# Patient Record
Sex: FEMALE | Race: AMERICAN INDIAN OR ALASKA NATIVE
[De-identification: names, ages, dates, MRNs, and addresses within clinical notes are randomized per-mention and may not be internally consistent; named-entity substitution may affect disease eponyms.]

---

## 2020-02-06 ENCOUNTER — HOSPITAL ENCOUNTER (EMERGENCY)
Dept: HOSPITAL 5 - ED | Age: 40
Discharge: HOME | End: 2020-02-06
Payer: SELF-PAY

## 2020-02-06 VITALS — SYSTOLIC BLOOD PRESSURE: 110 MMHG | DIASTOLIC BLOOD PRESSURE: 62 MMHG

## 2020-02-06 DIAGNOSIS — Z79.899: ICD-10-CM

## 2020-02-06 DIAGNOSIS — E03.9: ICD-10-CM

## 2020-02-06 DIAGNOSIS — Z79.1: ICD-10-CM

## 2020-02-06 DIAGNOSIS — J01.10: Primary | ICD-10-CM

## 2020-02-06 DIAGNOSIS — J20.9: ICD-10-CM

## 2020-02-06 DIAGNOSIS — Z98.890: ICD-10-CM

## 2020-02-06 LAB
BILIRUB UR QL STRIP: (no result)
BLOOD UR QL VISUAL: (no result)
MUCOUS THREADS #/AREA URNS HPF: (no result) /HPF
PH UR STRIP: 5 [PH] (ref 5–7)
PROT UR STRIP-MCNC: (no result) MG/DL
RBC #/AREA URNS HPF: 3 /HPF (ref 0–6)
UROBILINOGEN UR-MCNC: 2 MG/DL (ref ?–2)
WBC #/AREA URNS HPF: 3 /HPF (ref 0–6)

## 2020-02-06 PROCEDURE — 71046 X-RAY EXAM CHEST 2 VIEWS: CPT

## 2020-02-06 PROCEDURE — 81001 URINALYSIS AUTO W/SCOPE: CPT

## 2020-02-06 PROCEDURE — 99284 EMERGENCY DEPT VISIT MOD MDM: CPT

## 2020-02-06 PROCEDURE — 81025 URINE PREGNANCY TEST: CPT

## 2020-02-06 NOTE — XRAY REPORT
CHEST 2 VIEWS, 2/6/2020 9:22 PM



INDICATION: Cough



COMPARISON: None



FINDINGS:



Support devices: None

Heart: The cardiac silhouette is normal in size.

Lungs/pleura: The lungs are well expanded and appear clear.

Additional findings: No significant acute abnormality.



IMPRESSION:

1. No evidence of acute cardiopulmonary process.



Signer Name: Kathryn Ibarra MD 

Signed: 2/6/2020 9:41 PM

 Workstation Name: [id number]

## 2020-02-06 NOTE — EMERGENCY DEPARTMENT REPORT
- General


Chief Complaint: Upper Respiratory Infection


Stated Complaint: COUGH


Source: patient


Mode of arrival: Ambulatory


Limitations: No Limitations





- History of Present Illness


Initial Comments: 





Patient is a 39-year-old  female with no past medical history 

presents to the ED with complaint of acute onset persistent nasal and sinus 

congestion, dry cough, pleuritic chest pain, headache for the last 1 month.  

Patient states that in the last 1 week the cough severity has increased such 

that she he has a coughing spell she has urinary incontinence.  Patient denies 

fever, chills, nausea, vomiting, dizziness, syncope, chest pain, shortness of 

breath, abdominal pain, dysuria, urinary frequency and urgency, change in vision

or syncope.


MD Complaint: cough, rhinorrhea, nasal congestion, sinus pain


-: Gradual, month(s) (1)


Severity: severe


Severity scale (0 -10): 7


Quality: sharp, aching


Consistency: intermittent


Improves With: nothing


Worsens With: nothing


Context: sick contacts


Associated Symptoms: denies other symptoms, headache, rhinorrhea, nasal 

congestion, cough.  denies: fever, chills, myalgias, sore throat, stiff neck, 

chest pain, shortness of breath, nausea, vomiting, diarrhea, dysuria, confusion,

weight loss, epistaxis


Treatments Prior to Arrival: none





- Related Data


                                  Previous Rx's











 Medication  Instructions  Recorded  Last Taken  Type


 


Benzonatate [Tessalon Perles] 100 mg PO Q8HR #30 capsule 02/06/20 Unknown Rx


 


Doxycycline Hyclate 100 mg PO Q12H #20 tablet. 02/06/20 Unknown Rx


 


Ibuprofen [Motrin] 600 mg PO Q8H PRN #24 tablet 02/06/20 Unknown Rx


 


methylPREDNISolone [Medrol 4MG 4 mg PO DAILY #21 tab.ds.pk 02/06/20 Unknown Rx





DOSEPAK (21 tabs)]    











                                    Allergies











Allergy/AdvReac Type Severity Reaction Status Date / Time


 


No Known Allergies Allergy   Unverified 02/06/20 16:27














ED Review of Systems


ROS: 


Stated complaint: COUGH


Other details as noted in HPI





Constitutional: denies: chills, fever


Eyes: denies: eye pain, eye discharge, vision change


ENT: congestion.  denies: ear pain, throat pain


Respiratory: cough.  denies: shortness of breath, wheezing


Cardiovascular: denies: chest pain, palpitations


Endocrine: no symptoms reported


Gastrointestinal: denies: abdominal pain, nausea, diarrhea


Genitourinary: denies: urgency, dysuria, discharge


Musculoskeletal: denies: back pain, joint swelling, arthralgia


Skin: denies: rash, lesions


Neurological: denies: headache, weakness, paresthesias


Psychiatric: denies: anxiety, depression


Hematological/Lymphatic: denies: easy bleeding, easy bruising





ED Past Medical Hx





- Past Medical History


Additional medical history: HYPOTHYROIDISM





- Surgical History


Additional Surgical History: C SECTION





- Social History


Smoking Status: Never Smoker


Substance Use Type: None





- Medications


Home Medications: 


                                Home Medications











 Medication  Instructions  Recorded  Confirmed  Last Taken  Type


 


Benzonatate [Tessalon Perles] 100 mg PO Q8HR #30 capsule 02/06/20  Unknown Rx


 


Doxycycline Hyclate 100 mg PO Q12H #20 tablet.dr 02/06/20  Unknown Rx


 


Ibuprofen [Motrin] 600 mg PO Q8H PRN #24 tablet 02/06/20  Unknown Rx


 


methylPREDNISolone [Medrol 4MG 4 mg PO DAILY #21 tab.ds.pk 02/06/20  Unknown Rx





DOSEPAK (21 tabs)]     














ED Physical Exam





- General


Limitations: No Limitations


General appearance: alert, in no apparent distress





- Head


Head exam: Present: atraumatic, normocephalic, normal inspection





- Eye


Eye exam: Present: normal appearance, PERRL, EOMI


Pupils: Present: normal accommodation





- ENT


ENT exam: Present: normal exam, normal orophraynx, mucous membranes moist, TM's 

normal bilaterally, normal external ear exam, other (grossly congested nasal 

passages with frontal sinus tenderness to palpation)





- Neck


Neck exam: Present: normal inspection, full ROM.  Absent: tenderness, 

meningismus, lymphadenopathy





- Respiratory


Respiratory exam: Present: normal lung sounds bilaterally.  Absent: respiratory 

distress, wheezes, rales, rhonchi, chest wall tenderness, accessory muscle use, 

decreased breath sounds





- Cardiovascular


Cardiovascular Exam: Present: regular rate, normal rhythm, normal heart sounds. 

Absent: systolic murmur, diastolic murmur, rubs, gallop





- GI/Abdominal


GI/Abdominal exam: Present: soft, normal bowel sounds.  Absent: tenderness, 

guarding, hyperactive bowel sounds, hypoactive bowel sounds





- Extremities Exam


Extremities exam: Present: normal inspection, full ROM, normal capillary refill





- Back Exam


Back exam: Present: normal inspection, full ROM.  Absent: tenderness, CVA 

tenderness (R), CVA tenderness (L), muscle spasm, vertebral tenderness





- Neurological Exam


Neurological exam: Present: alert, oriented X3, CN II-XII intact, normal gait, 

reflexes normal





- Psychiatric


Psychiatric exam: Present: normal affect, normal mood, anxious





- Skin


Skin exam: Present: warm, dry, intact, normal color.  Absent: rash





ED Course





                                   Vital Signs











  02/06/20





  16:40


 


Temperature 97.5 F L


 


Pulse Rate 64


 


Respiratory 18





Rate 


 


Blood Pressure 113/78





[Right] 


 


O2 Sat by Pulse 97





Oximetry 














ED Medical Decision Making





- Radiology Data


Radiology results: report reviewed, image reviewed





Chest x-ray shows no acute pulmonary embolism and to pneumonitis, pneumothorax 

or pleural effusion.





- Medical Decision Making





This is a 39-year-old female who presented to the ED with persistent nasal and 

sinus congestion, persistent dry cough with pleuritic chest pain for one month. 

 In the ED, patient is alert and oriented 3 and is not in distress with normal 

vital signs.  Chest x-ray shows no acute cardiopulmonary abnormalities, 

pneumothorax, pneumonitis, or pleural effusion.  Patient was treated in the ED 

with steroids and pain medications, and on reevaluation, patient has not had any

 coughing spells while in the ED this patient of the treatment.  Patient was 

discharged home on medications and advised to follow-up with her primary care 

physician in 7-10 days for reevaluation.  Patient was also advised to return to 

the ED immediately if symptoms get worse.





- Differential Diagnosis


Sinusitis; bronchitis, pneumonia, URI


Critical care attestation.: 


If time is entered above; I have spent that time in minutes in the direct care 

of this critically ill patient, excluding procedure time.








ED Disposition


Clinical Impression: 


 Acute upper respiratory infection, Acute non-recurrent frontal sinusitis





Acute bronchitis


Qualifiers:


 Bronchitis organism: other organism Qualified Code(s): J20.8 - Acute bronchitis

 due to other specified organisms





Disposition: DC-01 TO HOME OR SELFCARE


Is pt being admited?: No


Does the pt Need Aspirin: No


Condition: Stable


Instructions:  Acute Bronchitis (ED), Upper Respiratory Infection (ED), Acute 

Bacterial Rhinosinusitis (ED)


Additional Instructions: 


Take medication with food, drink plenty of fluids and follow-up with your 

primary care physician in 5-7 days for reevaluation.  Return to the ED 

immediately if symptoms get worse.


Prescriptions: 


Doxycycline Hyclate 100 mg PO Q12H #20 tablet.dr


methylPREDNISolone [Medrol 4MG DOSEPAK (21 tabs)] 4 mg PO DAILY #21 tab.ds.pk


Ibuprofen [Motrin] 600 mg PO Q8H PRN #24 tablet


 PRN Reason: Pain


Benzonatate [Tessalon Perles] 100 mg PO Q8HR #30 capsule


Referrals: 


AG ROMERO MD [Staff Physician] - 3-5 Days


Forms:  Work/School Release Form(ED)


Time of Disposition: 21:29


Print Language: ENGLISH

## 2020-07-21 ENCOUNTER — RX ONLY (OUTPATIENT)
Age: 40
Setting detail: RX ONLY
End: 2020-07-21

## 2020-07-21 RX ORDER — FLUCONAZOLE 150 MG/1
TABLET ORAL
Qty: 3 | Refills: 1 | Status: ERX | COMMUNITY
Start: 2020-07-21

## 2020-07-28 ENCOUNTER — RX ONLY (OUTPATIENT)
Age: 40
Setting detail: RX ONLY
End: 2020-07-28

## 2020-07-28 RX ORDER — TRIAMCINOLONE ACETONIDE 1 MG/G
CREAM TOPICAL
Qty: 1 | Refills: 1 | Status: ERX | COMMUNITY
Start: 2020-07-28